# Patient Record
Sex: FEMALE | Race: WHITE | Employment: FULL TIME | URBAN - METROPOLITAN AREA
[De-identification: names, ages, dates, MRNs, and addresses within clinical notes are randomized per-mention and may not be internally consistent; named-entity substitution may affect disease eponyms.]

---

## 2017-05-16 ENCOUNTER — OFFICE VISIT (OUTPATIENT)
Dept: INTERNAL MEDICINE CLINIC | Age: 62
End: 2017-05-16

## 2017-05-16 VITALS
WEIGHT: 168 LBS | RESPIRATION RATE: 18 BRPM | SYSTOLIC BLOOD PRESSURE: 134 MMHG | BODY MASS INDEX: 24.88 KG/M2 | HEIGHT: 69 IN | TEMPERATURE: 98.2 F | HEART RATE: 78 BPM | DIASTOLIC BLOOD PRESSURE: 86 MMHG | OXYGEN SATURATION: 97 %

## 2017-05-16 DIAGNOSIS — Z00.00 WELL ADULT EXAM: ICD-10-CM

## 2017-05-16 DIAGNOSIS — Z12.39 BREAST CANCER SCREENING: ICD-10-CM

## 2017-05-16 DIAGNOSIS — Z12.11 COLON CANCER SCREENING: ICD-10-CM

## 2017-05-16 DIAGNOSIS — Z00.00 WELL ADULT EXAM: Primary | ICD-10-CM

## 2017-05-16 DIAGNOSIS — Z23 IMMUNIZATION DUE: ICD-10-CM

## 2017-05-16 NOTE — MR AVS SNAPSHOT
Visit Information Date & Time Provider Department Dept. Phone Encounter #  
 5/16/2017 10:15 AM Mary De La Garza MD [facility] [address] [id number] Upcoming Health Maintenance Date Due FOBT Q 1 YEAR AGE 50-75 5/18/2006 INFLUENZA AGE 9 TO ADULT 8/1/2017 BREAST CANCER SCRN MAMMOGRAM 5/16/2019 DTaP/Tdap/Td series (2 - Td) 7/5/2019 PAP AKA CERVICAL CYTOLOGY 5/16/2020 Allergies as of 5/16/2017  Review Complete On: 5/16/2017 By: Mary De La Garza MD  
 No Known Allergies Current Immunizations  Reviewed on 5/16/2017 Name Date Tdap 7/5/2009 Reviewed by Fozia Gr LPN on 7/90/7087 at 47:27 AM  
You Were Diagnosed With   
  
 Codes Comments Well adult exam    -  Primary ICD-10-CM: Z00.00 ICD-9-CM: V70.0 Immunization due     ICD-10-CM: Z23 ICD-9-CM: V05.9 Colon cancer screening     ICD-10-CM: Z12.11 ICD-9-CM: V76.51 Breast cancer screening     ICD-10-CM: Z12.39 
ICD-9-CM: V76.10 Vitals BP Pulse Temp Resp Height(growth percentile) Weight(growth percentile) 134/86 (BP 1 Location: Left arm, BP Patient Position: Sitting) 78 98.2 °F (36.8 °C) (Oral) 18 5' 9\" (1.753 m) 168 lb (76.2 kg) SpO2 BMI OB Status Smoking Status 97% 24.81 kg/m2 Postmenopausal Never Smoker Vitals History BMI and BSA Data Body Mass Index Body Surface Area  
 24.81 kg/m 2 1.93 m 2 [phone] Your Updated Medication List  
  
   
This list is accurate as of: 5/16/17 11:02 AM.  Always use your most recent med list.  
  
  
  
  
 varicella zoster vacine live 19,400 unit/0.65 mL Susr injection Commonly known as:  ZOSTAVAX  
1 Vial by SubCUTAneous route once for 1 dose. Indications: PREVENTION OF HERPES ZOSTER Prescriptions Printed Refills varicella zoster vacine live (ZOSTAVAX) 19,400 unit/0.65 mL susr injection 0 Si Vial by SubCUTAneous route once for 1 dose. Indications: PREVENTION OF HERPES ZOSTER Class: Print Route: SubCUTAneous We Performed the Following LIPID PANEL [00807 CPT(R)] METABOLIC PANEL, COMPREHENSIVE [87863 CPT(R)] REFERRAL FOR COLONOSCOPY [HFT493 Custom] Comments:  
 Please evaluate patient for colon cancer screening. VITAMIN D, 25 HYDROXY [id number] CPT(R)] To-Do List   
 2017 Imaging:  [facility] BI SCREENING INCL CAD   
  
 2017 Lab:  OCCULT BLOOD, IMMUNOASSAY (FIT) Referral Information Referral ID Referred By Referred To  
  
 [id number] [address] [place] [address] [address] [facility] [address] [facility], [address] Phone: [phone] Fax: [phone] Visits Status Start Date End Date 1 New Request 17 If your referral has a status of pending review or denied, additional information will be sent to support the outcome of this decision. Patient Instructions Varicella Virus Vaccine (By injection) Varicella Virus Vaccine (patsy-i-DELILAH-a VYE-bryanna VAX-een) Varivax® prevents chicken pox and Zostavax® prevents shingles. Both are caused by varicella virus. Brand Name(s): Varivax, Zostavax There may be other brand names for this medicine. When This Medicine Should Not Be Used: This vaccine is not right for everyone. You should not receive it if you had an allergic reaction to varicella virus vaccine, gelatin, or neomycin, or if you are pregnant. You should not receive it if you have a fever, an immune system problem, AIDS or HIV, a blood or bone marrow disorder, or tuberculosis. How to Use This Medicine:  
Injectable · Your doctor will prescribe your exact dose and tell you how often it should be given. This medicine is given as a shot under your skin. · A nurse or other health provider will give you this medicine. · Varivax®:  
¨ Most people will need 2 shots. Children usually receive one shot at 15to 13months of age and a second shot between 3and 10years of age. Teenagers and adults should have a second shot 4 weeks after the first dose. · Zostavax®:  
¨ Only 1 dose is needed. · Read and follow the patient instructions that come with this medicine. Talk to your doctor or pharmacist if you have any questions. · Missed dose: It is important that Varivax® be given at the proper time. If a scheduled shot is missed, call your doctor to make another appointment as soon as possible. Drugs and Foods to Avoid: Ask your doctor or pharmacist before using any other medicine, including over-the-counter medicines, vitamins, and herbal products. · You should not receive this vaccine if you are also taking cancer medicines or steroid medicine. · Tell your doctor if you plan to get a flu shot or other vaccines. Zostavax® should not be given with Pneumovax® 23 pneumococcal vaccine. · Children and adolescents should not take aspirin or medicines that contain aspirin (including cold medicines) for 6 weeks after receiving this vaccine. Warnings While Using This Medicine: · It is not safe to take this medicine during pregnancy. It could harm an unborn baby. Tell your doctor right away if you become pregnant. Do not become pregnant for 3 months after you receive this vaccine without first checking with your doctor. · Tell your doctor if you are breastfeeding, or if you have received a blood transfusion, other blood products, or an immune globulin. · You may be able to pass the virus to other people after your get this vaccine. You should avoid close contact with people at high risk for chickenpox for 6 weeks after you receive this vaccine.  Some examples of people who are at high risk are pregnant women,  babies, and those with immune system problems (including bone marrow disease, cancer, or AIDS). Talk to your doctor if you have questions. Possible Side Effects While Using This Medicine:  
Call your doctor right away if you notice any of these side effects: · Allergic reaction: Itching or hives, swelling in your face or hands, swelling or tingling in your mouth or throat, chest tightness, trouble breathing · Blistering, peeling, or red skin rash · Cough, chills, runny or stuffy nose, or cold-like symptoms · High fever (at least 102 degrees F in children) If you notice these less serious side effects, talk with your doctor: · Ear pain · Mild skin rash, itching, or dryness · Pain, redness, itching, swelling, rash, or a lump where the shot was given If you notice other side effects that you think are caused by this medicine, tell your doctor. Call your doctor for medical advice about side effects. You may report side effects to FDA at 2-596-FDA-4405 © 2017 [address] Information is for End User's use only and may not be sold, redistributed or otherwise used for commercial purposes. The above information is an  only. It is not intended as medical advice for individual conditions or treatments. Talk to your doctor, nurse or pharmacist before following any medical regimen to see if it is safe and effective for you. Introducing [facility]! Aicha Garcia introduces RadioRx patient portal. Now you can access parts of your medical record, email your doctor's office, and request medication refills online. 1. In your internet browser, go to [email or web address] 2. Click on the First Time User? Click Here link in the Sign In box. You will see the New Member Sign Up page. 3. Enter your RadioRx Access Code exactly as it appears below. You will not need to use this code after youve completed the sign-up process.  If you do not sign up before the expiration date, you must request a new code. · Plum Access Code: [email or web address] Expires: 8/14/2017  9:56 AM 
 
4. Enter the last four digits of your Social Security Number (xxxx) and Date of Birth (mm/dd/yyyy) as indicated and click Submit. You will be taken to the next sign-up page. 5. Create a Plum ID. This will be your Plum login ID and cannot be changed, so think of one that is secure and easy to remember. 6. Create a Plum password. You can change your password at any time. 7. Enter your Password Reset Question and Answer. This can be used at a later time if you forget your password. 8. Enter your e-mail address. You will receive e-mail notification when new information is available in [address] 9. Click Sign Up. You can now view and download portions of your medical record. 10. Click the Download Summary menu link to download a portable copy of your medical information. If you have questions, please visit the Frequently Asked Questions section of the Plum website. Remember, Plum is NOT to be used for urgent needs. For medical emergencies, dial 911. Now available from your iPhone and Android! Please provide this summary of care documentation to your next provider. Your primary care clinician is listed as Chadd Lema. If you have any questions after today's visit, please call [phone].

## 2017-05-16 NOTE — PATIENT INSTRUCTIONS
Varicella Virus Vaccine (By injection)   Varicella Virus Vaccine (patsy-i-DELILAH-a VYE-bryanna VAX-een)  Varivax® prevents chicken pox and Zostavax® prevents shingles. Both are caused by varicella virus. Brand Name(s): Varivax, Zostavax   There may be other brand names for this medicine. When This Medicine Should Not Be Used: This vaccine is not right for everyone. You should not receive it if you had an allergic reaction to varicella virus vaccine, gelatin, or neomycin, or if you are pregnant. You should not receive it if you have a fever, an immune system problem, AIDS or HIV, a blood or bone marrow disorder, or tuberculosis. How to Use This Medicine:   Injectable  · Your doctor will prescribe your exact dose and tell you how often it should be given. This medicine is given as a shot under your skin. · A nurse or other health provider will give you this medicine. · Varivax®:   ¨ Most people will need 2 shots. Children usually receive one shot at 15to 13months of age and a second shot between 3and 10years of age. Teenagers and adults should have a second shot 4 weeks after the first dose. · Zostavax®:   ¨ Only 1 dose is needed. · Read and follow the patient instructions that come with this medicine. Talk to your doctor or pharmacist if you have any questions. · Missed dose: It is important that Varivax® be given at the proper time. If a scheduled shot is missed, call your doctor to make another appointment as soon as possible. Drugs and Foods to Avoid:   Ask your doctor or pharmacist before using any other medicine, including over-the-counter medicines, vitamins, and herbal products. · You should not receive this vaccine if you are also taking cancer medicines or steroid medicine. · Tell your doctor if you plan to get a flu shot or other vaccines. Zostavax® should not be given with Pneumovax® 23 pneumococcal vaccine.   · Children and adolescents should not take aspirin or medicines that contain aspirin (including cold medicines) for 6 weeks after receiving this vaccine. Warnings While Using This Medicine:   · It is not safe to take this medicine during pregnancy. It could harm an unborn baby. Tell your doctor right away if you become pregnant. Do not become pregnant for 3 months after you receive this vaccine without first checking with your doctor. · Tell your doctor if you are breastfeeding, or if you have received a blood transfusion, other blood products, or an immune globulin. · You may be able to pass the virus to other people after your get this vaccine. You should avoid close contact with people at high risk for chickenpox for 6 weeks after you receive this vaccine. Some examples of people who are at high risk are pregnant women,  babies, and those with immune system problems (including bone marrow disease, cancer, or AIDS). Talk to your doctor if you have questions. Possible Side Effects While Using This Medicine:   Call your doctor right away if you notice any of these side effects:  · Allergic reaction: Itching or hives, swelling in your face or hands, swelling or tingling in your mouth or throat, chest tightness, trouble breathing  · Blistering, peeling, or red skin rash  · Cough, chills, runny or stuffy nose, or cold-like symptoms  · High fever (at least 102 degrees F in children)  If you notice these less serious side effects, talk with your doctor:   · Ear pain  · Mild skin rash, itching, or dryness  · Pain, redness, itching, swelling, rash, or a lump where the shot was given  If you notice other side effects that you think are caused by this medicine, tell your doctor. Call your doctor for medical advice about side effects. You may report side effects to FDA at 9-642-FDA-2496  © 2017 [address] [employer] Information is for End User's use only and may not be sold, redistributed or otherwise used for commercial purposes. The above information is an  only.  It is not intended as medical advice for individual conditions or treatments. Talk to your doctor, nurse or pharmacist before following any medical regimen to see if it is safe and effective for you.

## 2017-05-16 NOTE — PROGRESS NOTES
Chrissy Cantu is a 62 y.o. female and presents with Establish Care  . 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 She reports overall good health. She enjoys her work. She exercises with walking but does significant manual labor keeping her garden. She has family hx of CAD. She has a slight elevation in her ldl. She defers medication in the past because her hdl level is historically elevated. She reports overall good energy. Review of Systems  Constitutional: negative for fevers, chills, anorexia and weight loss  Eyes:   negative for visual disturbance and irritation  ENT:   negative for tinnitus,sore throat,nasal congestion,ear pains. hoarseness  Respiratory:  negative for cough, hemoptysis, dyspnea,wheezing  CV:   negative for chest pain, palpitations, lower extremity edema  GI:   negative for nausea, vomiting, diarrhea, abdominal pain,melena  Endo:               negative for polyuria,polydipsia,polyphagia,heat intolerance  Genitourinary: negative for frequency, dysuria and hematuria  Integument:  negative for rash and pruritus  Hematologic:  negative for easy bruising and gum/nose bleeding  Musculoskel: negative for myalgias, arthralgias, back pain, muscle weakness, joint pain  Neurological:  negative for headaches, dizziness, vertigo, memory problems and gait   Behavl/Psych: negative for feelings of anxiety, depression, mood changes    Past Medical History:   Diagnosis Date    Basal cell carcinoma in situ of skin     Gay Service    Hyperlipidemia      Past Surgical History:   Procedure Laterality Date    HX MOHS PROCEDURES       Social History     Social History    Marital status: UNKNOWN     Spouse name: N/A    Number of children: N/A    Years of education: N/A     Social History Main Topics    Smoking status: Never Smoker    Smokeless tobacco: Never Used    Alcohol use 3.5 oz/week     7 Glasses of wine per week      Comment: occasional 2-3//week    Drug use: No    Sexual activity: No     Other Topics Concern    None     Social History Narrative    Full time Physical therapist, [employer]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[facility]    Manageable stress    30 years PT        Not     No children     Family History   Problem Relation Age of Onset    Lung Disease Mother      lung cancer    Coronary Artery Disease Father 48       No Known Allergies    Objective:  Visit Vitals    /86 (BP 1 Location: Left arm, BP Patient Position: Sitting)    Pulse 78    Temp 98.2 °F (36.8 °C) (Oral)    Resp 18    Ht 5' 9\" (1.753 m)    Wt 168 lb (76.2 kg)    SpO2 97%    BMI 24.81 kg/m2     Physical Exam:   General appearance - alert, well appearing, and in no distress  Mental status - alert, oriented to person, place, and time  EYE-ABHIJIT, EOMI, corneas normal, no foreign bodies, visual acuity normal both eyes, no periorbital cellulitis  ENT-ENT exam normal, no neck nodes or sinus tenderness  Nose - normal and patent, no erythema, discharge or polyps  Mouth - mucous membranes moist, pharynx normal without lesions  Neck - supple, no significant adenopathy   Chest - clear to auscultation, no wheezes, rales or rhonchi, symmetric air entry   Heart - normal rate, regular rhythm, normal S1, S2, no murmurs, rubs, clicks or gallops   Abdomen - soft, nontender, nondistended, no masses or organomegaly  Lymph- no adenopathy palpable  Ext-peripheral pulses normal, no pedal edema, no clubbing or cyanosis  Skin-Warm and dry. no hyperpigmentation, vitiligo, or suspicious lesions  Neuro -alert, oriented, normal speech, no focal findings or movement disorder noted  Neck-normal C-spine, no tenderness, full ROM without pain      No results found for this or any previous visit.   Prevention    Cardiovascular profile  Family hx  Exercising:  Walks and works in the [employer], grows her own food  Blood pressure:  Health healthy diet:  Diabetes:  Cholesterol:  Renal function:      Cancer risk profile  Mammogram at [facility]  Colonoscopy done at 51   Skin nonhealing in 2 weeks dr. Marleni Hollingsworth basal cell  Gyn abnormal bleeding/discharge/abd pain/pressure 2016, Dr. Rupert Cerda      Thyroid sx    Osteopenia prevention  Calcium 1000mg/day yes  Vitamin D 800iu/day deficient    Mental health scale: 9/10  Depression  Anxiety  Sleep # of hours:  Energy Level:        Immunizations  TDAP  Pneumonia vaccine  Flu vaccine  Shingles vaccine  HPV        Yvonne Mins was seen today for establish care. Diagnoses and all orders for this visit:    Well adult exam  Pt appears in overall good physical and mental health. She does have family hx of CV disease. I asked her to recheck her blood pressure at work and >140/90 consistently we should start her on low dose arb. She is in agreement. Discussed optimizing bp control to decrease risk of chf  -     METABOLIC PANEL, COMPREHENSIVE  -     LIPID PANEL  -     VITAMIN D, 25 HYDROXY  -     OCCULT BLOOD, IMMUNOASSAY (FIT); Future    Immunization due  -     varicella zoster vacine live (ZOSTAVAX) 19,400 unit/0.65 mL susr injection; 1 Vial by SubCUTAneous route once for 1 dose. Indications: PREVENTION OF HERPES ZOSTER    Colon cancer screening  -     REFERRAL FOR COLONOSCOPY    Breast cancer screening  -     [facility]IA MAMMO BI SCREENING INCL CAD; Future      This note will not be viewable in MyChart.

## 2017-05-17 LAB
25(OH)D3+25(OH)D2 SERPL-MCNC: 23.2 NG/ML (ref 30–100)
ALBUMIN SERPL-MCNC: 4.2 G/DL (ref 3.6–4.8)
ALBUMIN/GLOB SERPL: 2 {RATIO} (ref 1.2–2.2)
ALP SERPL-CCNC: 48 IU/L (ref 39–117)
ALT SERPL-CCNC: 14 IU/L (ref 0–32)
AST SERPL-CCNC: 24 IU/L (ref 0–40)
BILIRUB SERPL-MCNC: 1.5 MG/DL (ref 0–1.2)
BUN SERPL-MCNC: 14 MG/DL (ref 8–27)
BUN/CREAT SERPL: 21 (ref 12–28)
CALCIUM SERPL-MCNC: 8.9 MG/DL (ref 8.7–10.3)
CHLORIDE SERPL-SCNC: 101 MMOL/L (ref 96–106)
CHOLEST SERPL-MCNC: 310 MG/DL (ref 100–199)
CO2 SERPL-SCNC: 22 MMOL/L (ref 18–29)
CREAT SERPL-MCNC: 0.66 MG/DL (ref 0.57–1)
GLOBULIN SER CALC-MCNC: 2.1 G/DL (ref 1.5–4.5)
GLUCOSE SERPL-MCNC: 87 MG/DL (ref 65–99)
HDLC SERPL-MCNC: 114 MG/DL
INTERPRETATION, 910389: NORMAL
LDLC SERPL CALC-MCNC: 185 MG/DL (ref 0–99)
POTASSIUM SERPL-SCNC: 4 MMOL/L (ref 3.5–5.2)
PROT SERPL-MCNC: 6.3 G/DL (ref 6–8.5)
SODIUM SERPL-SCNC: 141 MMOL/L (ref 134–144)
TRIGL SERPL-MCNC: 56 MG/DL (ref 0–149)
VLDLC SERPL CALC-MCNC: 11 MG/DL (ref 5–40)

## 2018-01-30 ENCOUNTER — OFFICE VISIT (OUTPATIENT)
Dept: INTERNAL MEDICINE CLINIC | Age: 63
End: 2018-01-30

## 2018-01-30 VITALS
WEIGHT: 167 LBS | DIASTOLIC BLOOD PRESSURE: 80 MMHG | HEART RATE: 99 BPM | OXYGEN SATURATION: 98 % | TEMPERATURE: 98.2 F | HEIGHT: 69 IN | RESPIRATION RATE: 16 BRPM | BODY MASS INDEX: 24.73 KG/M2 | SYSTOLIC BLOOD PRESSURE: 143 MMHG

## 2018-01-30 DIAGNOSIS — E78.2 MIXED HYPERLIPIDEMIA: ICD-10-CM

## 2018-01-30 DIAGNOSIS — R03.0 ELEVATED BLOOD PRESSURE READING WITHOUT DIAGNOSIS OF HYPERTENSION: ICD-10-CM

## 2018-01-30 DIAGNOSIS — E78.2 MIXED HYPERLIPIDEMIA: Primary | ICD-10-CM

## 2018-01-30 RX ORDER — ATORVASTATIN CALCIUM 10 MG/1
5 TABLET, FILM COATED ORAL DAILY
Qty: 30 TAB | Refills: 0 | Status: SHIPPED | OUTPATIENT
Start: 2018-01-30

## 2018-01-30 NOTE — PROGRESS NOTES
Clare Cisneros is a 65 y.o. female and presents with Blood Pressure Check  . Subjective:  Pt presents for follow up from last visit. She would like to discuss her blood pressure and last cholesterol with respect to her family hx where father  of massive MI at 49 yo + smoker. 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 She enjoys her work. She exercises with walking but does significant manual labor keeping her garden. She has family hx of CAD. Heavy gardening and walking 3 times/week 46    Review of Systems  Constitutional: negative for fevers, chills, anorexia and weight loss  Eyes:   negative for visual disturbance and irritation  ENT:   negative for tinnitus,sore throat,nasal congestion,ear pains. hoarseness  Respiratory:  negative for cough, hemoptysis, dyspnea,wheezing  CV:   negative for chest pain, palpitations, lower extremity edema  GI:   negative for nausea, vomiting, diarrhea, abdominal pain,melena  Endo:               negative for polyuria,polydipsia,polyphagia,heat intolerance  Genitourinary: negative for frequency, dysuria and hematuria  Integument:  negative for rash and pruritus  Hematologic:  negative for easy bruising and gum/nose bleeding  Musculoskel: negative for myalgias, arthralgias, back pain, muscle weakness, joint pain  Neurological:  negative for headaches, dizziness, vertigo, memory problems and gait   Behavl/Psych: negative for feelings of anxiety, depression, mood changes    Past Medical History:   Diagnosis Date    Basal cell carcinoma in situ of skin     Mammie Pippa    Hyperlipidemia      Past Surgical History:   Procedure Laterality Date    HX MOHS PROCEDURES       Social History     Social History    Marital status: UNKNOWN     Spouse name: N/A    Number of children: N/A    Years of education: N/A     Social History Main Topics    Smoking status: Never Smoker    Smokeless tobacco: Never Used    Alcohol use 3.5 oz/week     7 Glasses of wine per week      Comment: occasional 2-3//week    Drug use: No    Sexual activity: No     Other Topics Concern    None     Social History Narrative    Full time Physical therapist, [employer] in [facility]    Manageable stress    30 years PT        Not     No children     Family History   Problem Relation Age of Onset    Lung Disease Mother      lung cancer    Coronary Artery Disease Father 48       No Known Allergies    Objective:  Visit Vitals    /80 (BP 1 Location: Left arm, BP Patient Position: Sitting)    Pulse 99    Temp 98.2 °F (36.8 °C) (Oral)    Resp 16    Ht 5' 9\" (1.753 m)    Wt 167 lb (75.8 kg)    SpO2 98%    BMI 24.66 kg/m2     Physical Exam:   General appearance - alert, well appearing, and in no distress  Mental status - alert, oriented to person, place, and time  EYE-ABHIJIT, EOMI, corneas normal, no foreign bodies, visual acuity normal both eyes, no periorbital cellulitis  ENT-ENT exam normal, no neck nodes or sinus tenderness  Nose - normal and patent, no erythema, discharge or polyps  Mouth - mucous membranes moist, pharynx normal without lesions  Neck - supple, no significant adenopathy   Chest - clear to auscultation, no wheezes, rales or rhonchi, symmetric air entry   Heart - normal rate, regular rhythm, normal S1, S2, no murmurs, rubs, clicks or gallops   Abdomen - soft, nontender, nondistended, no masses or organomegaly  Lymph- no adenopathy palpable  Ext-peripheral pulses normal, no pedal edema, no clubbing or cyanosis  Skin-Warm and dry.  no hyperpigmentation, vitiligo, or suspicious lesions  Neuro -alert, oriented, normal speech, no focal findings or movement disorder noted  Neck-normal C-spine, no tenderness, full ROM without pain      Results for orders placed or performed in visit on 97/56/49   METABOLIC PANEL, COMPREHENSIVE   Result Value Ref Range    Glucose 87 65 - 99 mg/dL    BUN 14 8 - 27 mg/dL    Creatinine 0.66 0.57 - 1.00 mg/dL    GFR est non-AA 96 >59 mL/min/1.73    GFR est  >59 mL/min/1.73    BUN/Creatinine ratio 21 12 - 28    Sodium 141 134 - 144 mmol/L    Potassium 4.0 3.5 - 5.2 mmol/L    Chloride 101 96 - 106 mmol/L    CO2 22 18 - 29 mmol/L    Calcium 8.9 8.7 - 10.3 mg/dL    Protein, total 6.3 6.0 - 8.5 g/dL    Albumin 4.2 3.6 - 4.8 g/dL    GLOBULIN, TOTAL 2.1 1.5 - 4.5 g/dL    A-G Ratio 2.0 1.2 - 2.2    Bilirubin, total 1.5 (H) 0.0 - 1.2 mg/dL    Alk. phosphatase 48 39 - 117 IU/L    AST (SGOT) 24 0 - 40 IU/L    ALT (SGPT) 14 0 - 32 IU/L   LIPID PANEL   Result Value Ref Range    Cholesterol, total 310 (H) 100 - 199 mg/dL    Triglyceride 56 0 - 149 mg/dL    HDL Cholesterol 114 >39 mg/dL    VLDL, calculated 11 5 - 40 mg/dL    LDL, calculated 185 (H) 0 - 99 mg/dL   VITAMIN D, 25 HYDROXY   Result Value Ref Range    VITAMIN D, 25-HYDROXY 23.2 (L) 30.0 - 100.0 ng/mL   CVD REPORT   Result Value Ref Range    INTERPRETATION Note      Prevention    Cardiovascular profile  Family hx  Exercising:  Walks and works in the [employer], grows her own food  Blood pressure:  Health healthy diet:  Diabetes:  Cholesterol:  Renal function:      Cancer risk profile  [facility]ammogram at [facility]  Colonoscopy done at 51   Skin nonhealing in 2 weeks dr. Vargas Ada basal cell  Gyn abnormal bleeding/discharge/abd pain/pressure 2016, Dr. Tafoya Seek      Thyroid sx    Osteopenia prevention  Calcium 1000mg/day yes  Vitamin D 800iu/day deficient    Mental health scale: 9/10  Depression  Anxiety  Sleep # of hours:  Energy Level:        Immunizations  TDAP  Pneumonia vaccine  Flu vaccine  Shingles vaccine  HPV        Diagnoses and all orders for this visit:  We discussed pt 's family  Hx and cholesterol and blood pressure. Her 10 yr ACC ascvd score is 7.0% not including family hx. She does not need to go on statin but her LDL from 5/2017 is elevated and likely needs but agreed she would try a little to 5-10 mg three times per week to see if it decreases her LDL.   I gave her lab slip to valeri in the future and will come back to see how the small amount affects her ldl. Her bp is elevated due to new bp standards 130/80. Will monitor this as well. She may need very low dose valsartan at next visit. She will continue to monitor    1. Mixed hyperlipidemia  -     atorvastatin (LIPITOR) 10 mg tablet; Take 0.5 Tabs by mouth daily.  -     LIPID PANEL; Future    2. Elevated blood pressure reading without diagnosis of hypertension  -     METABOLIC PANEL, COMPREHENSIVE; Future    I spent 25 min with pt and >50% of the time was spent in management and counseling of pt re: her cholesterol and cv risk. SED re: statin. I think at this dose benefit outweighs risk. She agrees. Follow up in 3-4 months  This note will not be viewable in MyChart.

## 2024-08-30 ENCOUNTER — HOSPITAL ENCOUNTER (OUTPATIENT)
Facility: HOSPITAL | Age: 69
Setting detail: SPECIMEN
Discharge: HOME | End: 2024-09-02

## 2024-12-26 ENCOUNTER — OFFICE VISIT (OUTPATIENT)
Facility: CLINIC | Age: 69
End: 2024-12-26
Payer: MEDICARE

## 2024-12-26 VITALS
HEART RATE: 73 BPM | WEIGHT: 141.2 LBS | HEIGHT: 69 IN | SYSTOLIC BLOOD PRESSURE: 153 MMHG | RESPIRATION RATE: 14 BRPM | DIASTOLIC BLOOD PRESSURE: 83 MMHG | OXYGEN SATURATION: 98 % | BODY MASS INDEX: 20.91 KG/M2

## 2024-12-26 DIAGNOSIS — E55.9 VITAMIN D DEFICIENCY: ICD-10-CM

## 2024-12-26 DIAGNOSIS — E53.8 LOW SERUM VITAMIN B12: ICD-10-CM

## 2024-12-26 DIAGNOSIS — Z00.00 MEDICARE ANNUAL WELLNESS VISIT, SUBSEQUENT: Primary | ICD-10-CM

## 2024-12-26 DIAGNOSIS — E78.00 ELEVATED LDL CHOLESTEROL LEVEL: ICD-10-CM

## 2024-12-26 PROCEDURE — 99213 OFFICE O/P EST LOW 20 MIN: CPT | Performed by: INTERNAL MEDICINE

## 2024-12-26 ASSESSMENT — PATIENT HEALTH QUESTIONNAIRE - PHQ9
SUM OF ALL RESPONSES TO PHQ QUESTIONS 1-9: 0
SUM OF ALL RESPONSES TO PHQ9 QUESTIONS 1 & 2: 0
1. LITTLE INTEREST OR PLEASURE IN DOING THINGS: NOT AT ALL
2. FEELING DOWN, DEPRESSED OR HOPELESS: NOT AT ALL
SUM OF ALL RESPONSES TO PHQ QUESTIONS 1-9: 0

## 2024-12-26 ASSESSMENT — LIFESTYLE VARIABLES
HOW MANY STANDARD DRINKS CONTAINING ALCOHOL DO YOU HAVE ON A TYPICAL DAY: PATIENT DOES NOT DRINK
HOW OFTEN DO YOU HAVE A DRINK CONTAINING ALCOHOL: NEVER

## 2024-12-26 NOTE — PATIENT INSTRUCTIONS
pain reliever, such as acetaminophen (Tylenol).   When should you call for help?   Call 911 if you have symptoms of a heart attack. These may include:    Chest pain or pressure, or a strange feeling in the chest.     Sweating.     Shortness of breath.     Pain, pressure, or a strange feeling in the back, neck, jaw, or upper belly or in one or both shoulders or arms.     Lightheadedness or sudden weakness.     A fast or irregular heartbeat.   After you call 911, the  may tell you to chew 1 adult-strength or 2 to 4 low-dose aspirin. Wait for an ambulance. Do not try to drive yourself.  Watch closely for changes in your health, and be sure to contact your doctor if you have any problems.  Where can you learn more?  Go to https://www.[employer].net/patientEd and enter F075 to learn more about \"A Healthy Heart: Care Instructions.\"  Current as of: June 24, 2023  Content Version: 14.2  © 2024 [employer].   Care instructions adapted under license by [employer]. If you have questions about a medical condition or this instruction, always ask your healthcare professional. [employer], [employer] disclaims any warranty or liability for your use of this information.      Personalized Preventive Plan for Briana Mckeon - 12/26/2024  Medicare offers a range of preventive health benefits. Some of the tests and screenings are paid in full while other may be subject to a deductible, co-insurance, and/or copay.    Some of these benefits include a comprehensive review of your medical history including lifestyle, illnesses that may run in your family, and various assessments and screenings as appropriate.    After reviewing your medical record and screening and assessments performed today your provider may have ordered immunizations, labs, imaging, and/or referrals for you.  A list of these orders (if applicable) as well as your Preventive Care list are included within your After Visit Summary for your

## 2024-12-26 NOTE — PROGRESS NOTES
Surg Hx  Soc Hx  Fam Hx          This medical record was transcribed using an electronic medical records/speech recognition system.  Although proofread, it may and can contain electronic, spelling and other errors.  Corrections may be executed at a later time.  Please contact us for any clarifications as needed.  Aside from patient's Medicare visit on this date 12/26/24  I have spent 30 minutes reviewing previous notes, test results and face to face with the patient discussing the diagnosis and importance of compliance with the treatment plan as well as documenting on the day of the visit.The patient (or guardian, if applicable) and other individuals in attendance with the patient were advised that Artificial Intelligence will be utilized during this visit to record, process the conversation to generate a clinical note, and support improvement of the AI technology. The patient (or guardian, if applicable) and other individuals in attendance at the appointment consented to the use of AI, including the recording.